# Patient Record
Sex: MALE | Race: WHITE | Employment: OTHER | URBAN - METROPOLITAN AREA
[De-identification: names, ages, dates, MRNs, and addresses within clinical notes are randomized per-mention and may not be internally consistent; named-entity substitution may affect disease eponyms.]

---

## 2022-05-16 ENCOUNTER — TELEPHONE (OUTPATIENT)
Dept: FAMILY MEDICINE CLINIC | Facility: CLINIC | Age: 72
End: 2022-05-16

## 2022-05-16 NOTE — TELEPHONE ENCOUNTER
Surgery on 06/14/22, LTHA with Dr. Ankur Sanchez   @ [address] [place] [address]    H&P- completed 05/17/22  Labs- CBC, INR will be sent to scanning- RBC (4.21), Mono% (13.8), (INR 2.2). BUN (20), BUN/Creat ratio (21.7), +MSSA, all other labs WNL  EKG- abnormal- a-fib- Poor R wave progression, nonspecific ST depression and nonspecific T-abnormality. No previous EKG's available    Patient sees Cardiology in [facility])-  Dr. Tatyana Rodrigez- [phone]   F- [phone]  A-fib  Cardiac Clx sent to scanning 05/30/22  \"This letter is to confirm that Mr. Nelson is cleared for surgery from a Cardiac perspective. Please feel free to contact my office with any questions. \"    Patient states his PCP manages Coumadin for A-fib  Dr. Leigh Ann Santorof- [phone]  F- [phone]  Dr. Sandrine Cervantes called and differe any coumadin recommendations to Dr. Júnior Gallego. Dr Júnior Gallego ordered Lovenox 150mg/ml daily 4 days prior to surgery. He will hold coumadin starting 06/09/22, he will start Lovenox 06/10/22-06/13/22. He will hold the day of surgery. Patient will also need Bactroban ointment BID for 5 days prior to surgery. Sent a fax to Dr. Sandrine Cervantes for him to send in both meds for patient since pt lives in [place] Told them to call with any questions. Spoke to the patient and gave him instructions over the phone for lovenox and bactroban and date he needs to hold/take medications. He states understanding.  05/31/22

## 2022-05-17 ENCOUNTER — OFFICE VISIT (OUTPATIENT)
Dept: FAMILY MEDICINE CLINIC | Facility: CLINIC | Age: 72
End: 2022-05-17

## 2022-05-17 ENCOUNTER — EKG ENCOUNTER (OUTPATIENT)
Dept: LAB | Facility: HOSPITAL | Age: 72
End: 2022-05-17
Attending: FAMILY MEDICINE

## 2022-05-17 ENCOUNTER — LAB ENCOUNTER (OUTPATIENT)
Dept: LAB | Facility: HOSPITAL | Age: 72
End: 2022-05-17
Attending: FAMILY MEDICINE

## 2022-05-17 VITALS
RESPIRATION RATE: 16 BRPM | HEART RATE: 70 BPM | BODY MASS INDEX: 33.33 KG/M2 | TEMPERATURE: 97 F | OXYGEN SATURATION: 98 % | HEIGHT: 69 IN | DIASTOLIC BLOOD PRESSURE: 78 MMHG | SYSTOLIC BLOOD PRESSURE: 152 MMHG | WEIGHT: 225 LBS

## 2022-05-17 DIAGNOSIS — Z01.818 PREOP EXAMINATION: ICD-10-CM

## 2022-05-17 DIAGNOSIS — M15.9 PRIMARY OSTEOARTHRITIS INVOLVING MULTIPLE JOINTS: ICD-10-CM

## 2022-05-17 DIAGNOSIS — M47.816 LUMBAR SPONDYLOSIS: ICD-10-CM

## 2022-05-17 DIAGNOSIS — I48.0 PAROXYSMAL ATRIAL FIBRILLATION (HCC): ICD-10-CM

## 2022-05-17 DIAGNOSIS — I10 PRIMARY HYPERTENSION: ICD-10-CM

## 2022-05-17 DIAGNOSIS — Z01.812 PRE-OPERATIVE LABORATORY EXAMINATION: ICD-10-CM

## 2022-05-17 DIAGNOSIS — G47.33 OSA (OBSTRUCTIVE SLEEP APNEA): ICD-10-CM

## 2022-05-17 DIAGNOSIS — M16.12 PRIMARY OSTEOARTHRITIS OF LEFT HIP: Primary | ICD-10-CM

## 2022-05-17 DIAGNOSIS — C61 PROSTATE CANCER (HCC): ICD-10-CM

## 2022-05-17 DIAGNOSIS — Z95.2 H/O MECHANICAL AORTIC VALVE REPLACEMENT: ICD-10-CM

## 2022-05-17 DIAGNOSIS — F39 AFFECTIVE DISORDER (HCC): ICD-10-CM

## 2022-05-17 LAB
ALBUMIN SERPL-MCNC: 4 G/DL (ref 3.4–5)
ALBUMIN/GLOB SERPL: 1.1 {RATIO} (ref 1–2)
ALP LIVER SERPL-CCNC: 60 U/L
ALT SERPL-CCNC: 25 U/L
ANION GAP SERPL CALC-SCNC: 8 MMOL/L (ref 0–18)
ANTIBODY SCREEN: NEGATIVE
AST SERPL-CCNC: 26 U/L (ref 15–37)
BILIRUB SERPL-MCNC: 0.7 MG/DL (ref 0.1–2)
BUN BLD-MCNC: 20 MG/DL (ref 7–18)
BUN/CREAT SERPL: 21.7 (ref 10–20)
CALCIUM BLD-MCNC: 9 MG/DL (ref 8.5–10.1)
CHLORIDE SERPL-SCNC: 106 MMOL/L (ref 98–112)
CO2 SERPL-SCNC: 26 MMOL/L (ref 21–32)
CREAT BLD-MCNC: 0.92 MG/DL
FASTING STATUS PATIENT QL REPORTED: YES
GLOBULIN PLAS-MCNC: 3.6 G/DL (ref 2.8–4.4)
GLUCOSE BLD-MCNC: 99 MG/DL (ref 70–99)
OSMOLALITY SERPL CALC.SUM OF ELEC: 293 MOSM/KG (ref 275–295)
POTASSIUM SERPL-SCNC: 4.3 MMOL/L (ref 3.5–5.1)
PROT SERPL-MCNC: 7.6 G/DL (ref 6.4–8.2)
RH BLOOD TYPE: POSITIVE
RH BLOOD TYPE: POSITIVE
SODIUM SERPL-SCNC: 140 MMOL/L (ref 136–145)

## 2022-05-17 PROCEDURE — 36415 COLL VENOUS BLD VENIPUNCTURE: CPT

## 2022-05-17 PROCEDURE — 86900 BLOOD TYPING SEROLOGIC ABO: CPT

## 2022-05-17 PROCEDURE — 86850 RBC ANTIBODY SCREEN: CPT

## 2022-05-17 PROCEDURE — 86901 BLOOD TYPING SEROLOGIC RH(D): CPT

## 2022-05-17 PROCEDURE — 3008F BODY MASS INDEX DOCD: CPT | Performed by: FAMILY MEDICINE

## 2022-05-17 PROCEDURE — 99204 OFFICE O/P NEW MOD 45 MIN: CPT | Performed by: FAMILY MEDICINE

## 2022-05-17 PROCEDURE — 3078F DIAST BP <80 MM HG: CPT | Performed by: FAMILY MEDICINE

## 2022-05-17 PROCEDURE — 87081 CULTURE SCREEN ONLY: CPT

## 2022-05-17 PROCEDURE — 93010 ELECTROCARDIOGRAM REPORT: CPT | Performed by: FAMILY MEDICINE

## 2022-05-17 PROCEDURE — 3077F SYST BP >= 140 MM HG: CPT | Performed by: FAMILY MEDICINE

## 2022-05-17 PROCEDURE — 80053 COMPREHEN METABOLIC PANEL: CPT

## 2022-05-17 PROCEDURE — 93005 ELECTROCARDIOGRAM TRACING: CPT

## 2022-05-17 RX ORDER — WARFARIN SODIUM 6 MG/1
6 TABLET ORAL DAILY
COMMUNITY

## 2022-05-18 RX ORDER — ENZALUTAMIDE 40 MG/1
160 CAPSULE ORAL DAILY
COMMUNITY

## 2022-05-18 RX ORDER — ESCITALOPRAM OXALATE 10 MG/1
10 TABLET ORAL DAILY
COMMUNITY

## 2022-05-18 RX ORDER — FLUTICASONE FUROATE 27.5 UG/1
1 SPRAY, METERED NASAL DAILY PRN
COMMUNITY

## 2022-05-18 RX ORDER — ENZALUTAMIDE 80 MG/1
160 TABLET ORAL DAILY
COMMUNITY
End: 2022-05-18

## 2022-05-18 RX ORDER — TELMISARTAN AND HYDROCHLORTHIAZIDE 80; 12.5 MG/1; MG/1
2 TABLET ORAL DAILY
COMMUNITY

## 2022-05-18 RX ORDER — AMLODIPINE BESYLATE 5 MG/1
5 TABLET ORAL DAILY
COMMUNITY

## 2022-05-31 RX ORDER — ENOXAPARIN SODIUM 150 MG/ML
1 INJECTION SUBCUTANEOUS DAILY
Qty: 8 EACH | Refills: 0 | Status: SHIPPED
Start: 2022-05-31

## 2022-05-31 RX ORDER — ENOXAPARIN SODIUM 150 MG/ML
1 INJECTION SUBCUTANEOUS DAILY
Qty: 8 EACH | Refills: 0 | OUTPATIENT
Start: 2022-05-31 | End: 2022-05-31

## 2022-05-31 NOTE — TELEPHONE ENCOUNTER
Dr. Kari Singh, please review:    Labs- CBC, INR will be sent to scanning- RBC (4.21), Mono% (13.8), (INR 2.2). BUN (20), BUN/Creat ratio (21.7), +MSSA, all other labs WNL  EKG- abnormal- a-fib- Poor R wave progression, nonspecific ST depression and nonspecific T-abnormality. No previous EKG's available    Cardiac Clx scanned into [facility] 05/27/22  \"This letter is to confirm that Mr. Nelson is cleared for surgery from a Cardiac perspective. Please feel free to contact my office with any questions. \"    Dr. Ronald Tran (PCP who manages coumadin)  called and differe any coumadin recommendations to Dr. Kari Singh. Dr Kari Singh ordered Lovenox 150mg/ml daily 4 days prior to surgery. He will hold coumadin starting 06/09/22, he will start Lovenox 06/10/22-06/13/22. He will hold the day of surgery. Patient will also need Bactroban ointment BID for 5 days prior to surgery. Sent a fax to Dr. Ronald Tran for him to send in both meds for patient since pt lives in [place] Told them to call with any questions. Spoke to the patient and gave him instructions over the phone for lovenox and bactroban and date he needs to hold/take medications. He states understanding. 05/31/22    OK for surgery?

## 2022-05-31 NOTE — TELEPHONE ENCOUNTER
Patient notified he is clear for surgery and instructions on meds below. He will call us if he has any trouble getting his meds from PCP.

## 2022-06-01 NOTE — TELEPHONE ENCOUNTER
Received script from PCP Dr. Gui Araya office via fax for Lovenox and abx ointment. Need to call and see why it was sent to us and make sure it was sent to [facility]).

## 2022-06-02 NOTE — TELEPHONE ENCOUNTER
Spoke to RN at Dr. Urmila Herrera office, they misunderstood us and thought we needed a script for the meds. They will be sending in both Lovenox and Bactroban for patient to a local pharmacy in [facility]).

## 2022-06-10 PROBLEM — Z95.2 S/P AVR: Status: ACTIVE | Noted: 2022-06-10

## 2022-06-10 PROBLEM — M47.816 LUMBAR SPONDYLOSIS: Status: ACTIVE | Noted: 2022-06-10

## 2022-06-10 PROBLEM — M16.12 PRIMARY OSTEOARTHRITIS OF ONE HIP, LEFT: Status: ACTIVE | Noted: 2022-06-10

## 2022-06-10 PROBLEM — Z85.46 HISTORY OF PROSTATE CANCER: Status: ACTIVE | Noted: 2022-06-10

## 2022-06-10 RX ORDER — METOCLOPRAMIDE 10 MG/1
10 TABLET ORAL ONCE
Status: CANCELLED | OUTPATIENT
Start: 2022-06-10 | End: 2022-06-10

## 2022-06-10 RX ORDER — ACETAMINOPHEN AND CODEINE PHOSPHATE 300; 30 MG/1; MG/1
1 TABLET ORAL EVERY 4 HOURS PRN
COMMUNITY
End: 2022-06-15

## 2022-06-10 RX ORDER — CLOBETASOL PROPIONATE 0.5 MG/G
CREAM TOPICAL 2 TIMES DAILY
COMMUNITY

## 2022-06-13 ENCOUNTER — LAB ENCOUNTER (OUTPATIENT)
Dept: LAB | Facility: HOSPITAL | Age: 72
End: 2022-06-13
Attending: ORTHOPAEDIC SURGERY

## 2022-06-13 DIAGNOSIS — Z01.818 PRE-OP TESTING: ICD-10-CM

## 2022-06-13 LAB — SARS-COV-2 RNA RESP QL NAA+PROBE: NOT DETECTED

## 2022-06-14 ENCOUNTER — HOSPITAL ENCOUNTER (OUTPATIENT)
Facility: HOSPITAL | Age: 72
Discharge: HOME HEALTH | End: 2022-06-15
Attending: ORTHOPAEDIC SURGERY | Admitting: ORTHOPAEDIC SURGERY

## 2022-06-14 ENCOUNTER — APPOINTMENT (OUTPATIENT)
Dept: GENERAL RADIOLOGY | Facility: HOSPITAL | Age: 72
End: 2022-06-14
Attending: ORTHOPAEDIC SURGERY

## 2022-06-14 ENCOUNTER — ANESTHESIA EVENT (OUTPATIENT)
Dept: SURGERY | Facility: HOSPITAL | Age: 72
End: 2022-06-14

## 2022-06-14 ENCOUNTER — ANESTHESIA (OUTPATIENT)
Dept: SURGERY | Facility: HOSPITAL | Age: 72
End: 2022-06-14

## 2022-06-14 DIAGNOSIS — Z01.818 PRE-OP TESTING: Primary | ICD-10-CM

## 2022-06-14 PROBLEM — Z96.659 HISTORY OF REVISION OF TOTAL KNEE ARTHROPLASTY: Status: ACTIVE | Noted: 2022-06-14

## 2022-06-14 LAB
INR BLD: 0.98 (ref 0.8–1.2)
PROTHROMBIN TIME: 13.1 SECONDS (ref 11.6–14.8)

## 2022-06-14 PROCEDURE — 97110 THERAPEUTIC EXERCISES: CPT

## 2022-06-14 PROCEDURE — 0SRB04A REPLACEMENT OF LEFT HIP JOINT WITH CERAMIC ON POLYETHYLENE SYNTHETIC SUBSTITUTE, UNCEMENTED, OPEN APPROACH: ICD-10-PCS | Performed by: ORTHOPAEDIC SURGERY

## 2022-06-14 PROCEDURE — 36415 COLL VENOUS BLD VENIPUNCTURE: CPT | Performed by: ORTHOPAEDIC SURGERY

## 2022-06-14 PROCEDURE — 85610 PROTHROMBIN TIME: CPT | Performed by: ORTHOPAEDIC SURGERY

## 2022-06-14 PROCEDURE — 8E0YXBZ COMPUTER ASSISTED PROCEDURE OF LOWER EXTREMITY: ICD-10-PCS | Performed by: ORTHOPAEDIC SURGERY

## 2022-06-14 PROCEDURE — 97116 GAIT TRAINING THERAPY: CPT

## 2022-06-14 PROCEDURE — 97162 PT EVAL MOD COMPLEX 30 MIN: CPT

## 2022-06-14 PROCEDURE — 88311 DECALCIFY TISSUE: CPT | Performed by: ORTHOPAEDIC SURGERY

## 2022-06-14 PROCEDURE — 88305 TISSUE EXAM BY PATHOLOGIST: CPT | Performed by: ORTHOPAEDIC SURGERY

## 2022-06-14 PROCEDURE — 73502 X-RAY EXAM HIP UNI 2-3 VIEWS: CPT | Performed by: ORTHOPAEDIC SURGERY

## 2022-06-14 DEVICE — IMPLANTABLE DEVICE: Type: IMPLANTABLE DEVICE | Site: HIP | Status: FUNCTIONAL

## 2022-06-14 DEVICE — IMPLANTABLE DEVICE
Type: IMPLANTABLE DEVICE | Site: HIP | Status: FUNCTIONAL
Brand: G7® ACETABULAR SYSTEM

## 2022-06-14 DEVICE — IMPLANTABLE DEVICE
Type: IMPLANTABLE DEVICE | Site: HIP | Status: FUNCTIONAL
Brand: G7® VIVACIT-E®

## 2022-06-14 RX ORDER — HYDROMORPHONE HYDROCHLORIDE 1 MG/ML
0.2 INJECTION, SOLUTION INTRAMUSCULAR; INTRAVENOUS; SUBCUTANEOUS EVERY 5 MIN PRN
Status: DISCONTINUED | OUTPATIENT
Start: 2022-06-14 | End: 2022-06-14 | Stop reason: HOSPADM

## 2022-06-14 RX ORDER — HYDROMORPHONE HYDROCHLORIDE 1 MG/ML
0.4 INJECTION, SOLUTION INTRAMUSCULAR; INTRAVENOUS; SUBCUTANEOUS EVERY 2 HOUR PRN
Status: DISCONTINUED | OUTPATIENT
Start: 2022-06-14 | End: 2022-06-15

## 2022-06-14 RX ORDER — FAMOTIDINE 20 MG/1
20 TABLET, FILM COATED ORAL ONCE
Status: COMPLETED | OUTPATIENT
Start: 2022-06-14 | End: 2022-06-14

## 2022-06-14 RX ORDER — SENNOSIDES 8.6 MG
17.2 TABLET ORAL NIGHTLY
Status: DISCONTINUED | OUTPATIENT
Start: 2022-06-14 | End: 2022-06-15

## 2022-06-14 RX ORDER — OXYCODONE HYDROCHLORIDE 5 MG/1
2.5 TABLET ORAL EVERY 4 HOURS PRN
Status: DISCONTINUED | OUTPATIENT
Start: 2022-06-14 | End: 2022-06-15

## 2022-06-14 RX ORDER — MORPHINE SULFATE 10 MG/ML
6 INJECTION, SOLUTION INTRAMUSCULAR; INTRAVENOUS EVERY 10 MIN PRN
Status: DISCONTINUED | OUTPATIENT
Start: 2022-06-14 | End: 2022-06-14 | Stop reason: HOSPADM

## 2022-06-14 RX ORDER — TRANEXAMIC ACID 10 MG/ML
1000 INJECTION, SOLUTION INTRAVENOUS ONCE
Status: COMPLETED | OUTPATIENT
Start: 2022-06-15 | End: 2022-06-14

## 2022-06-14 RX ORDER — WARFARIN SODIUM 6 MG/1
6 TABLET ORAL NIGHTLY
Status: DISCONTINUED | OUTPATIENT
Start: 2022-06-14 | End: 2022-06-14

## 2022-06-14 RX ORDER — DOCUSATE SODIUM 100 MG/1
100 CAPSULE, LIQUID FILLED ORAL 2 TIMES DAILY
Status: DISCONTINUED | OUTPATIENT
Start: 2022-06-14 | End: 2022-06-15

## 2022-06-14 RX ORDER — ONDANSETRON 2 MG/ML
4 INJECTION INTRAMUSCULAR; INTRAVENOUS EVERY 4 HOURS PRN
Status: DISCONTINUED | OUTPATIENT
Start: 2022-06-14 | End: 2022-06-15

## 2022-06-14 RX ORDER — MORPHINE SULFATE 4 MG/ML
4 INJECTION, SOLUTION INTRAMUSCULAR; INTRAVENOUS EVERY 10 MIN PRN
Status: DISCONTINUED | OUTPATIENT
Start: 2022-06-14 | End: 2022-06-14 | Stop reason: HOSPADM

## 2022-06-14 RX ORDER — MORPHINE SULFATE 4 MG/ML
2 INJECTION, SOLUTION INTRAMUSCULAR; INTRAVENOUS EVERY 10 MIN PRN
Status: DISCONTINUED | OUTPATIENT
Start: 2022-06-14 | End: 2022-06-14 | Stop reason: HOSPADM

## 2022-06-14 RX ORDER — ENOXAPARIN SODIUM 100 MG/ML
40 INJECTION SUBCUTANEOUS ONCE
Status: COMPLETED | OUTPATIENT
Start: 2022-06-14 | End: 2022-06-14

## 2022-06-14 RX ORDER — FLUTICASONE PROPIONATE 50 MCG
2 SPRAY, SUSPENSION (ML) NASAL DAILY
Status: DISCONTINUED | OUTPATIENT
Start: 2022-06-14 | End: 2022-06-15

## 2022-06-14 RX ORDER — NALOXONE HYDROCHLORIDE 0.4 MG/ML
80 INJECTION, SOLUTION INTRAMUSCULAR; INTRAVENOUS; SUBCUTANEOUS AS NEEDED
Status: DISCONTINUED | OUTPATIENT
Start: 2022-06-14 | End: 2022-06-14 | Stop reason: HOSPADM

## 2022-06-14 RX ORDER — SODIUM CHLORIDE, SODIUM LACTATE, POTASSIUM CHLORIDE, CALCIUM CHLORIDE 600; 310; 30; 20 MG/100ML; MG/100ML; MG/100ML; MG/100ML
INJECTION, SOLUTION INTRAVENOUS CONTINUOUS
Status: DISCONTINUED | OUTPATIENT
Start: 2022-06-14 | End: 2022-06-15

## 2022-06-14 RX ORDER — PANTOPRAZOLE SODIUM 40 MG/1
40 TABLET, DELAYED RELEASE ORAL
Qty: 30 TABLET | Refills: 0 | Status: SHIPPED | OUTPATIENT
Start: 2022-06-14

## 2022-06-14 RX ORDER — DEXAMETHASONE SODIUM PHOSPHATE 4 MG/ML
VIAL (ML) INJECTION AS NEEDED
Status: DISCONTINUED | OUTPATIENT
Start: 2022-06-14 | End: 2022-06-14 | Stop reason: SURG

## 2022-06-14 RX ORDER — TRANEXAMIC ACID 10 MG/ML
INJECTION, SOLUTION INTRAVENOUS AS NEEDED
Status: DISCONTINUED | OUTPATIENT
Start: 2022-06-14 | End: 2022-06-14 | Stop reason: SURG

## 2022-06-14 RX ORDER — OXYCODONE HYDROCHLORIDE 5 MG/1
5 TABLET ORAL EVERY 4 HOURS PRN
Status: DISCONTINUED | OUTPATIENT
Start: 2022-06-14 | End: 2022-06-15

## 2022-06-14 RX ORDER — ESCITALOPRAM OXALATE 5 MG/1
10 TABLET ORAL EVERY EVENING
Status: DISCONTINUED | OUTPATIENT
Start: 2022-06-14 | End: 2022-06-15

## 2022-06-14 RX ORDER — ACETAMINOPHEN 500 MG
1000 TABLET ORAL ONCE
Status: COMPLETED | OUTPATIENT
Start: 2022-06-14 | End: 2022-06-14

## 2022-06-14 RX ORDER — SCOLOPAMINE TRANSDERMAL SYSTEM 1 MG/1
1 PATCH, EXTENDED RELEASE TRANSDERMAL ONCE
Status: DISCONTINUED | OUTPATIENT
Start: 2022-06-14 | End: 2022-06-15

## 2022-06-14 RX ORDER — ESCITALOPRAM OXALATE 5 MG/1
10 TABLET ORAL DAILY
Status: DISCONTINUED | OUTPATIENT
Start: 2022-06-14 | End: 2022-06-14

## 2022-06-14 RX ORDER — SODIUM PHOSPHATE, DIBASIC AND SODIUM PHOSPHATE, MONOBASIC 7; 19 G/133ML; G/133ML
1 ENEMA RECTAL ONCE AS NEEDED
Status: DISCONTINUED | OUTPATIENT
Start: 2022-06-14 | End: 2022-06-15

## 2022-06-14 RX ORDER — CEFAZOLIN SODIUM/WATER 2 G/20 ML
2 SYRINGE (ML) INTRAVENOUS EVERY 8 HOURS
Status: COMPLETED | OUTPATIENT
Start: 2022-06-14 | End: 2022-06-15

## 2022-06-14 RX ORDER — OXYCODONE HCL 10 MG/1
10 TABLET, FILM COATED, EXTENDED RELEASE ORAL ONCE
Status: COMPLETED | OUTPATIENT
Start: 2022-06-14 | End: 2022-06-14

## 2022-06-14 RX ORDER — LIDOCAINE HYDROCHLORIDE 40 MG/ML
SOLUTION TOPICAL AS NEEDED
Status: DISCONTINUED | OUTPATIENT
Start: 2022-06-14 | End: 2022-06-14 | Stop reason: SURG

## 2022-06-14 RX ORDER — ONDANSETRON 2 MG/ML
INJECTION INTRAMUSCULAR; INTRAVENOUS AS NEEDED
Status: DISCONTINUED | OUTPATIENT
Start: 2022-06-14 | End: 2022-06-14 | Stop reason: SURG

## 2022-06-14 RX ORDER — ENOXAPARIN SODIUM 100 MG/ML
40 INJECTION SUBCUTANEOUS DAILY
Status: DISCONTINUED | OUTPATIENT
Start: 2022-06-15 | End: 2022-06-14

## 2022-06-14 RX ORDER — ENOXAPARIN SODIUM 100 MG/ML
40 INJECTION SUBCUTANEOUS DAILY
Status: DISCONTINUED | OUTPATIENT
Start: 2022-06-15 | End: 2022-06-15

## 2022-06-14 RX ORDER — GABAPENTIN 100 MG/1
200 CAPSULE ORAL 3 TIMES DAILY
Qty: 180 CAPSULE | Refills: 0 | Status: SHIPPED | OUTPATIENT
Start: 2022-06-14 | End: 2022-07-14

## 2022-06-14 RX ORDER — CEFAZOLIN SODIUM/WATER 2 G/20 ML
2 SYRINGE (ML) INTRAVENOUS ONCE
Status: COMPLETED | OUTPATIENT
Start: 2022-06-14 | End: 2022-06-14

## 2022-06-14 RX ORDER — WARFARIN SODIUM 1 MG/1
1 TABLET ORAL NIGHTLY
Status: DISCONTINUED | OUTPATIENT
Start: 2022-06-14 | End: 2022-06-14

## 2022-06-14 RX ORDER — ROCURONIUM BROMIDE 10 MG/ML
INJECTION, SOLUTION INTRAVENOUS AS NEEDED
Status: DISCONTINUED | OUTPATIENT
Start: 2022-06-14 | End: 2022-06-14 | Stop reason: SURG

## 2022-06-14 RX ORDER — POLYETHYLENE GLYCOL 3350 17 G/17G
17 POWDER, FOR SOLUTION ORAL DAILY PRN
Status: DISCONTINUED | OUTPATIENT
Start: 2022-06-14 | End: 2022-06-15

## 2022-06-14 RX ORDER — GLYCOPYRROLATE 0.2 MG/ML
INJECTION, SOLUTION INTRAMUSCULAR; INTRAVENOUS AS NEEDED
Status: DISCONTINUED | OUTPATIENT
Start: 2022-06-14 | End: 2022-06-14 | Stop reason: SURG

## 2022-06-14 RX ORDER — BISACODYL 10 MG
10 SUPPOSITORY, RECTAL RECTAL
Status: DISCONTINUED | OUTPATIENT
Start: 2022-06-14 | End: 2022-06-15

## 2022-06-14 RX ORDER — ACETAMINOPHEN 500 MG
TABLET ORAL EVERY 6 HOURS PRN
Qty: 60 TABLET | Refills: 2 | Status: SHIPPED | OUTPATIENT
Start: 2022-06-14

## 2022-06-14 RX ORDER — DOCUSATE SODIUM 100 MG/1
100 CAPSULE, LIQUID FILLED ORAL 2 TIMES DAILY
Qty: 60 CAPSULE | Refills: 1 | Status: SHIPPED | OUTPATIENT
Start: 2022-06-14

## 2022-06-14 RX ORDER — SODIUM CHLORIDE, SODIUM LACTATE, POTASSIUM CHLORIDE, CALCIUM CHLORIDE 600; 310; 30; 20 MG/100ML; MG/100ML; MG/100ML; MG/100ML
INJECTION, SOLUTION INTRAVENOUS CONTINUOUS
Status: DISCONTINUED | OUTPATIENT
Start: 2022-06-14 | End: 2022-06-14 | Stop reason: HOSPADM

## 2022-06-14 RX ORDER — DIPHENHYDRAMINE HYDROCHLORIDE 50 MG/ML
12.5 INJECTION INTRAMUSCULAR; INTRAVENOUS EVERY 4 HOURS PRN
Status: DISCONTINUED | OUTPATIENT
Start: 2022-06-14 | End: 2022-06-15

## 2022-06-14 RX ORDER — LIDOCAINE HYDROCHLORIDE 10 MG/ML
INJECTION, SOLUTION EPIDURAL; INFILTRATION; INTRACAUDAL; PERINEURAL AS NEEDED
Status: DISCONTINUED | OUTPATIENT
Start: 2022-06-14 | End: 2022-06-14 | Stop reason: SURG

## 2022-06-14 RX ORDER — CLOBETASOL PROPIONATE 0.5 MG/G
CREAM TOPICAL 2 TIMES DAILY PRN
Status: DISCONTINUED | OUTPATIENT
Start: 2022-06-14 | End: 2022-06-15

## 2022-06-14 RX ORDER — WARFARIN SODIUM 7.5 MG/1
7.5 TABLET ORAL NIGHTLY
Status: DISCONTINUED | OUTPATIENT
Start: 2022-06-14 | End: 2022-06-15

## 2022-06-14 RX ORDER — PROCHLORPERAZINE EDISYLATE 5 MG/ML
10 INJECTION INTRAMUSCULAR; INTRAVENOUS EVERY 6 HOURS PRN
Status: DISCONTINUED | OUTPATIENT
Start: 2022-06-14 | End: 2022-06-15

## 2022-06-14 RX ORDER — HYDROMORPHONE HYDROCHLORIDE 1 MG/ML
0.6 INJECTION, SOLUTION INTRAMUSCULAR; INTRAVENOUS; SUBCUTANEOUS EVERY 5 MIN PRN
Status: DISCONTINUED | OUTPATIENT
Start: 2022-06-14 | End: 2022-06-14 | Stop reason: HOSPADM

## 2022-06-14 RX ORDER — DIPHENHYDRAMINE HCL 25 MG
25 CAPSULE ORAL EVERY 4 HOURS PRN
Status: DISCONTINUED | OUTPATIENT
Start: 2022-06-14 | End: 2022-06-15

## 2022-06-14 RX ORDER — ONDANSETRON 4 MG/1
4 TABLET, FILM COATED ORAL EVERY 6 HOURS PRN
Qty: 20 TABLET | Refills: 0 | Status: SHIPPED | OUTPATIENT
Start: 2022-06-14

## 2022-06-14 RX ORDER — NEOSTIGMINE METHYLSULFATE 1 MG/ML
INJECTION INTRAVENOUS AS NEEDED
Status: DISCONTINUED | OUTPATIENT
Start: 2022-06-14 | End: 2022-06-14 | Stop reason: SURG

## 2022-06-14 RX ORDER — TELMISARTAN AND HYDROCHLORTHIAZIDE 80; 12.5 MG/1; MG/1
2 TABLET ORAL DAILY
Status: DISCONTINUED | OUTPATIENT
Start: 2022-06-14 | End: 2022-06-14

## 2022-06-14 RX ORDER — TELMISARTAN AND HYDROCHLORTHIAZIDE 80; 12.5 MG/1; MG/1
2 TABLET ORAL DAILY
Status: DISCONTINUED | OUTPATIENT
Start: 2022-06-14 | End: 2022-06-15

## 2022-06-14 RX ORDER — TRAMADOL HYDROCHLORIDE 50 MG/1
TABLET ORAL EVERY 6 HOURS PRN
Qty: 60 TABLET | Refills: 1 | Status: SHIPPED | OUTPATIENT
Start: 2022-06-14

## 2022-06-14 RX ORDER — CELECOXIB 200 MG/1
200 CAPSULE ORAL ONCE
Status: COMPLETED | OUTPATIENT
Start: 2022-06-14 | End: 2022-06-14

## 2022-06-14 RX ORDER — DIPHENHYDRAMINE HYDROCHLORIDE 50 MG/ML
25 INJECTION INTRAMUSCULAR; INTRAVENOUS ONCE AS NEEDED
Status: ACTIVE | OUTPATIENT
Start: 2022-06-14 | End: 2022-06-14

## 2022-06-14 RX ORDER — ENOXAPARIN SODIUM 100 MG/ML
40 INJECTION SUBCUTANEOUS DAILY
Qty: 14 EACH | Refills: 0 | Status: SHIPPED | OUTPATIENT
Start: 2022-06-14

## 2022-06-14 RX ORDER — OXYCODONE HYDROCHLORIDE 5 MG/1
TABLET ORAL
Qty: 60 TABLET | Refills: 0 | Status: SHIPPED | OUTPATIENT
Start: 2022-06-14

## 2022-06-14 RX ORDER — CYCLOBENZAPRINE HCL 5 MG
5 TABLET ORAL EVERY 8 HOURS PRN
Status: DISCONTINUED | OUTPATIENT
Start: 2022-06-14 | End: 2022-06-15

## 2022-06-14 RX ORDER — HYDROMORPHONE HYDROCHLORIDE 1 MG/ML
0.4 INJECTION, SOLUTION INTRAMUSCULAR; INTRAVENOUS; SUBCUTANEOUS EVERY 5 MIN PRN
Status: DISCONTINUED | OUTPATIENT
Start: 2022-06-14 | End: 2022-06-14 | Stop reason: HOSPADM

## 2022-06-14 RX ORDER — HYDROMORPHONE HYDROCHLORIDE 1 MG/ML
0.2 INJECTION, SOLUTION INTRAMUSCULAR; INTRAVENOUS; SUBCUTANEOUS EVERY 2 HOUR PRN
Status: DISCONTINUED | OUTPATIENT
Start: 2022-06-14 | End: 2022-06-15

## 2022-06-14 RX ORDER — AMLODIPINE BESYLATE 5 MG/1
5 TABLET ORAL DAILY
Status: DISCONTINUED | OUTPATIENT
Start: 2022-06-14 | End: 2022-06-15

## 2022-06-14 RX ADMIN — ROCURONIUM BROMIDE 40 MG: 10 INJECTION, SOLUTION INTRAVENOUS at 07:38:00

## 2022-06-14 RX ADMIN — LIDOCAINE HYDROCHLORIDE 50 MG: 10 INJECTION, SOLUTION EPIDURAL; INFILTRATION; INTRACAUDAL; PERINEURAL at 07:28:00

## 2022-06-14 RX ADMIN — LIDOCAINE HYDROCHLORIDE 4 ML: 40 SOLUTION TOPICAL at 07:34:00

## 2022-06-14 RX ADMIN — DEXAMETHASONE SODIUM PHOSPHATE 4 MG: 4 MG/ML VIAL (ML) INJECTION at 07:45:00

## 2022-06-14 RX ADMIN — SODIUM CHLORIDE, SODIUM LACTATE, POTASSIUM CHLORIDE, CALCIUM CHLORIDE: 600; 310; 30; 20 INJECTION, SOLUTION INTRAVENOUS at 09:18:00

## 2022-06-14 RX ADMIN — GLYCOPYRROLATE 0.2 MG: 0.2 INJECTION, SOLUTION INTRAMUSCULAR; INTRAVENOUS at 08:00:00

## 2022-06-14 RX ADMIN — CEFAZOLIN SODIUM/WATER 2 G: 2 G/20 ML SYRINGE (ML) INTRAVENOUS at 07:40:00

## 2022-06-14 RX ADMIN — SODIUM CHLORIDE, SODIUM LACTATE, POTASSIUM CHLORIDE, CALCIUM CHLORIDE: 600; 310; 30; 20 INJECTION, SOLUTION INTRAVENOUS at 07:27:00

## 2022-06-14 RX ADMIN — TRANEXAMIC ACID 1000 MG: 10 INJECTION, SOLUTION INTRAVENOUS at 07:44:00

## 2022-06-14 RX ADMIN — GLYCOPYRROLATE 0.8 MG: 0.2 INJECTION, SOLUTION INTRAMUSCULAR; INTRAVENOUS at 09:25:00

## 2022-06-14 RX ADMIN — ONDANSETRON 4 MG: 2 INJECTION INTRAMUSCULAR; INTRAVENOUS at 07:45:00

## 2022-06-14 RX ADMIN — NEOSTIGMINE METHYLSULFATE 5 MG: 1 INJECTION INTRAVENOUS at 09:25:00

## 2022-06-14 NOTE — OPERATIVE REPORT
OPERATIVE REPORT     PROCEDURE DATE: 6/14/2022     SURGEON: Ivanna Blount MD      ASSISTANT SURGEON: Polina Washington MD  (No qualified resident was available to assist with this case; we will thus bill for fellow)    ASSISTANT: Keyana Cooney, surgical assist.     PREOPERATIVE DIAGNOSIS: left hip degenerative joint disease     POSTOPERATIVE DIAGNOSIS: left hip degenerative joint disease    PROCEDURE: left total hip arthoplasty    ANESTHESIA: GETA    PREOPERATIVE ANTIBIOTICS: Ancef 2 g IV within 60 minutes of procedure start. ESTIMATED BLOOD LOSS: 200 mL. ESTIMATED IV FLUIDS: see anesthesia record    ESTIMATED URINE OUTPUT: no rowell       IMPLANTS   1. Sarah G7 acetabular shell, multihole, 58 mm outer diameter, size G.   2. 6.5 mm bone screws x30  3. Sarah G7 highly cross-linked polyethylene liner; neutral, 4 mm inner diameter, size G.   4. Sarah M/L taper femoral stem, standard offset, size 11.   5. Sarah Biolox Delta ceramic femoral head, 40 mm, +3.5, 12-14 taper. SPECIMENS  Femoral head to pathology    COMPLICATIONS   None apparent. FINDINGS   Consistent with end-stage hip degenerative joint disease. INDICATIONS   Tejas Golden is a 73year old male who has been followed by the orthopedic surgery service for left hip degenerative joint disease. Tejas Golden was previously seen and evaluated in the orthopedic clinic and diagnosed with hip degenerative joint disease. After nonoperative treatment measures such as physical therapy, activity modification, weight loss, and NSAIDs failed to improved the patient's symptoms, Tejas Golden wished to proceed with surgery and was therefore scheduled for the above-described procedure on an elective basis. TECHNIQUE   Tejas Golden was identified and greeted in the preoperative holding area and was identified using medical record number, name, and date of birth, all of which were confirmed.  The operative hip was marked using an indelible marker and informed consent was verbally confirmed. The patient had previously signed a consent in clinic. Once again, all risks and benefits as well as alternatives to surgical intervention were discussed with the patient in detail and all the questions were answered. Risks discussed included but were not limited to pain, infection, bleeding, scarring, stiffness, thromboembolic events, severe limb dysfunction, loss of limb, and loss of life. The patient verbally confirmed the consent and wished to proceed with surgery as scheduled. The anesthesia service then proceeded with anesthesia and August Mejia was taken to the operating room and placed on the operating table in the lateral decubitus position. Care was taken to pad all bony prominences well. The patient was locked in the lateral decubitus position using the standard positioners. The lower extremity was then prepped and draped in a standard fashion. A preprocedural timeout was then performed once again confirming the patient's correct identity, correct procedure, correct side, and correct site. In addition, allergy status and required equipment were reviewed. Three independent members of the operating room team agreed on the above-mentioned parameters. The ASIS was identified and approximately 2-3 cm posterior to it on the illiac crest a percutaneous stab incision was made and a rikki pin was placed. A second incision was also made and a second rikki pin was placed. The hip navigation [employer] platform was attached to the two pins. Next, A standard posterior approach to the operative hip was then performed in the usual fashion. Once the gluteus essie fascia was identified, it was split in line with its fibers under careful hemostasis using a Bovie. The tip of the trochanter was then identified and a standard arthrotomy was performed, traveling from quadratus femoris in line with the gluteus medius fibers proximally.  The posterior hip capsule was tacked using #1 Ethibond suture. Prior to hip dislocation, the intellijoint button was secured to the greater trochanter with 1 screw and the center of hip rotation was determined from the intellijoint system. Next,  the hip was dislocated without complications. The saddle point of the femur and the lesser trochanter were directly palpated and visualized and freed of any interposed tissues. A standard neck cut was then performed according to preoperative templating using an oscillating saw and an osteotome. The femur was then placed anteriorly to the acetabulum and retracted using an anterior retractor. A second retractor was placed on the posterior inferior aspect of the acetabulum. Next, any remaining labral remnants and scar tissue were carefully removed circumferentially from the acetabulum. The cotyloid fossa was cleared out of any soft tissues under careful hemostasis using a Bovie. Sequential reaming was then performed. Trialing was then performed which was found to fit the patient's anatomy well and abduction, anteversion and offset were all confirmed with the intellijoint system. Accordingly, an appropriately sized Sarah G7 acetabular component was then placed and impacted into place using gentle mallet blows. It was secured in place using two 6.5 mm bone screws. A poly trial was then placed. Attention was then turned to the femur and box osteotome was used to lateralize and a canal finder was used to obtain the adequate trajectory. Sequential broaching was then performed using a Sarah M/L taper broaches. Trialing was then performed and the components were found to achieve excellent stability. Leg length was found to be equal. Accordingly, all trials and the femoral broach were removed. The definitive Sarah G7 liner was placed and impacted using gentle mallet blows.  Once its adequate position was confirmed, attention was turned back to the femur and the above documented Sarah M/L taper was impacted into the proximal femur. Care was taken to avoid any calcar fractures. Trialing was once again commenced and excellent stability was achieved. Accordingly, the head was exchanged for the Biolox ceramic head specifically documented above. The hip was once again reduced and final time taken through range of motion with adequate stability and offset, anteversion and abduction again confirmed using the [employer] navigation system. The two steimann pinns as well as the trochanter button and screw were all removed and accounted for prior to closure. The hip was then irrigated, and the posterior capsule was carefully closed using previously placed tag sutures. It was sutured into the gluteus medius tendon and its insertion at the greater trochanter. A tight capsular repair was achieved. The hip was then once again irrigated using normal saline and closed in a layered fashion. The gluteus essie fascia was closed using #2 quil, and a local anesthetic cocktail was injected into the fascia and subcutaneous tissue circumferentially amrit-incisionally. Subcutaneous closure was achieved using #0 quil, and the skin was closed using staples. Skin was dressed using a Prevena wound vac dressing. The patient was then rolled back into the supine position, transferred onto a regular bed and brought to PACU in stable condition. At the end of the procedure, all sponge, needle, instrument, and scalpel counts were performed and found to be correct. Attending physician Dr. Johnny Mejias was scrubbed and present for all key parts of the procedure. He supervised the entire procedure.        POSTOPERATIVE PLAN  -admit to orthopaedics  -pain control  -DVT ppx: mechanical + lovenox and warfarin  -Weight bearing as tolerated on the left lower extremity, posterior hip precautions  -PT on POD #0  -Medicine co-management        Lina Vega MD  Fellow - Adult Reconstruction  Department of Orthopaedic Surgery  6/14/2022  9:54 AM

## 2022-06-14 NOTE — INTERVAL H&P NOTE
Pre-op Diagnosis: left hip degenerative joint disease    The above referenced H&P was reviewed by Emma Hall MD on 6/14/2022, the patient was examined and no significant changes have occurred in the patient's condition since the H&P was performed. I discussed with the patient and/or legal representative the potential benefits, risks and side effects of this procedure; the likelihood of the patient achieving goals; and potential problems that might occur during recuperation. I discussed reasonable alternatives to the procedure, including risks, benefits and side effects related to the alternatives and risks related to not receiving this procedure. We will proceed with procedure as planned.

## 2022-06-14 NOTE — CM/SW NOTE
CM spoke with RN Lois Slaughter, Pts plan is to return home to [facility]) with [facility] on dc. In the event that pt will need HH, Pts primary Dr. Josephine Lepe [phone] is will to follow pt and arrange [facility]. Plan: Return to [place] with Outpt PT when stable. Presley Toney.  Reyes Irving RN, BSN  Nurse   [phone]

## 2022-06-14 NOTE — ANESTHESIA PROCEDURE NOTES
Airway  Date/Time: 6/14/2022 7:34 AM  Urgency: elective      General Information and Staff    Patient location during procedure: OR  Anesthesiologist: Penni Cheadle, MD  Performed: anesthesiologist     Indications and Patient Condition  Indications for airway management: anesthesia  Sedation level: deep  Preoxygenated: yes  Patient position: sniffing  Mask difficulty assessment: 1 - vent by mask    Final Airway Details  Final airway type: endotracheal airway      Successful airway: ETT  Cuffed: yes   Successful intubation technique: direct laryngoscopy  Facilitating devices/methods: intubating stylet  Endotracheal tube insertion site: oral  Blade: Dedrick  Blade size: #3  ETT size (mm): 7.5    Cormack-Lehane Classification: grade I - full view of glottis  Placement verified by: capnometry   Measured from: lips  ETT to lips (cm): 23  Number of attempts at approach: 1    Additional Comments  Atraumatic x1

## 2022-06-14 NOTE — ANESTHESIA POSTPROCEDURE EVALUATION
Patient: Lynn Aguayo    Procedure Summary     Date: 06/14/22 Room / Location: [facility] MAIN OR 04 / [facility] MAIN OR    Anesthesia Start: 9599 Anesthesia Stop: 1000    Procedure: left total hip arthroplasty (Left Hip) Diagnosis: (left hip degenerative joint disease)    Surgeons: Rakel Puri MD Anesthesiologist: Fan Nur MD    Anesthesia Type: general ASA Status: 3          Anesthesia Type: general    Vitals Value Taken Time   /81 06/14/22 1000   Temp 97.8 06/14/22 1000   Pulse 64 06/14/22 1000   Resp 14 06/14/22 1000   SpO2 94 06/14/22 1000       [facility] AN Post Evaluation:   Patient Evaluated in PACU  Patient Participation: complete - patient participated  Level of Consciousness: awake and alert  Pain Management: adequate  Airway Patency:patent  Yes    Cardiovascular Status: acceptable  Respiratory Status: acceptable and nasal cannula  Postoperative Hydration acceptable      Birney MD Mark  6/14/2022 10:00 AM

## 2022-06-15 VITALS
WEIGHT: 223 LBS | RESPIRATION RATE: 16 BRPM | DIASTOLIC BLOOD PRESSURE: 67 MMHG | BODY MASS INDEX: 33.03 KG/M2 | OXYGEN SATURATION: 96 % | HEART RATE: 57 BPM | SYSTOLIC BLOOD PRESSURE: 151 MMHG | TEMPERATURE: 98 F | HEIGHT: 69 IN

## 2022-06-15 LAB
ANION GAP SERPL CALC-SCNC: 5 MMOL/L (ref 0–18)
BUN BLD-MCNC: 15 MG/DL (ref 7–18)
BUN/CREAT SERPL: 18.1 (ref 10–20)
CALCIUM BLD-MCNC: 8.1 MG/DL (ref 8.5–10.1)
CHLORIDE SERPL-SCNC: 104 MMOL/L (ref 98–112)
CO2 SERPL-SCNC: 28 MMOL/L (ref 21–32)
CREAT BLD-MCNC: 0.83 MG/DL
GLUCOSE BLD-MCNC: 133 MG/DL (ref 70–99)
HCT VFR BLD AUTO: 33 %
HGB BLD-MCNC: 11.3 G/DL
INR BLD: 1.06 (ref 0.8–1.2)
OSMOLALITY SERPL CALC.SUM OF ELEC: 287 MOSM/KG (ref 275–295)
POTASSIUM SERPL-SCNC: 3.6 MMOL/L (ref 3.5–5.1)
PROTHROMBIN TIME: 13.9 SECONDS (ref 11.6–14.8)
SODIUM SERPL-SCNC: 137 MMOL/L (ref 136–145)

## 2022-06-15 PROCEDURE — 85014 HEMATOCRIT: CPT | Performed by: ORTHOPAEDIC SURGERY

## 2022-06-15 PROCEDURE — 97530 THERAPEUTIC ACTIVITIES: CPT

## 2022-06-15 PROCEDURE — 85610 PROTHROMBIN TIME: CPT | Performed by: ORTHOPAEDIC SURGERY

## 2022-06-15 PROCEDURE — 85018 HEMOGLOBIN: CPT | Performed by: ORTHOPAEDIC SURGERY

## 2022-06-15 PROCEDURE — 97116 GAIT TRAINING THERAPY: CPT

## 2022-06-15 PROCEDURE — 97165 OT EVAL LOW COMPLEX 30 MIN: CPT

## 2022-06-15 PROCEDURE — 97535 SELF CARE MNGMENT TRAINING: CPT

## 2022-06-15 PROCEDURE — 80048 BASIC METABOLIC PNL TOTAL CA: CPT | Performed by: ORTHOPAEDIC SURGERY

## 2022-06-15 RX ORDER — HYDROCODONE BITARTRATE AND ACETAMINOPHEN 5; 325 MG/1; MG/1
1 TABLET ORAL EVERY 6 HOURS PRN
Status: DISCONTINUED | OUTPATIENT
Start: 2022-06-15 | End: 2022-06-15

## 2022-06-15 RX ORDER — ACETAMINOPHEN 500 MG
500 TABLET ORAL EVERY 6 HOURS PRN
Status: DISCONTINUED | OUTPATIENT
Start: 2022-06-15 | End: 2022-06-15

## 2022-06-15 RX ORDER — ENOXAPARIN SODIUM 150 MG/ML
150 INJECTION SUBCUTANEOUS DAILY
Qty: 1 ML | Refills: 0 | Status: SHIPPED | OUTPATIENT
Start: 2022-06-15

## 2022-06-15 RX ORDER — HYDROCODONE BITARTRATE AND ACETAMINOPHEN 5; 325 MG/1; MG/1
1 TABLET ORAL EVERY 6 HOURS PRN
Qty: 30 TABLET | Refills: 0 | Status: SHIPPED | OUTPATIENT
Start: 2022-06-15

## 2022-06-15 RX ORDER — TRAMADOL HYDROCHLORIDE 50 MG/1
50 TABLET ORAL EVERY 6 HOURS PRN
Status: DISCONTINUED | OUTPATIENT
Start: 2022-06-15 | End: 2022-06-15

## 2022-06-15 NOTE — PROGRESS NOTES
Face to Face Encounter    I (or a non-physician practitioner working in collaboration with me) had a face to face encounter with this patient during which a medical condition was addressed which is the primary reason for home health care on : 6/15/2022    The encounter was in whole, or in part, for the following medical conditions which is the primary reason for home care. Joint replacement surgery    Based on my findings, the following services are medically necessary home health services (Check all that apply): Nursing, because vital signs monitoring, PT/ INR monitoring and assessment for signs and symptoms of bleeding (if pt on coumadin), wound/ incision assessment, pain assessment and instruction related to pain management, and Physical Therapy, because evaluation of environment for safety (pt is at fall risk), gait training and instruction in the use of assistive devices, instruction and monitoring of therapeutic exercise. The following clinical findings and patient's condition support that this patient is homebound, because narcotic usage every 4-6 hours, inability to ambulate greater than 150 feet, inability to drive a vehicle, fatigue due to anemia, increased risk for infection and increased risk for bleeding. Certification for [address]  Based on the above findings, I certify that this patient is confined to the home (meets homebound criteria listed above) and needs intermittent skilled nursing care, physical therapy and /or speech therapy or continues to need occupational therapy. The patient is under my care, and I have initiated the establishment of the plan of care. This patient will be followed by a physician who will periodically review the plan of care.      Bette Whittington  Nurse Practitioner for Dr. Darren Santizo: [phone]  F: [phone]

## 2022-06-15 NOTE — CM/SW NOTE
06/15/22 1100   Discharge disposition   Expected discharge disposition Home-Health   Post Acute Care Provider Other  (Pts PMD in [facility]) to arrange )   Discharge transportation Private car     Pt discussed during nursing rounds. Pt is stable for dc today and pts daughter is requesting HH on dc. Alvarado Anne NP to send [place] orders to PTs PMD in [facility]). PMD to arranged HH. MD dc order entered. Plan: Home today w/ family and begin [place] upon return to [facility]). / to remain available for support and/or discharge planning. [facility].  Danie Arthur RN, BSN  Nurse   [phone]

## (undated) DEVICE — PILLOW FX 56X38X15CM MED HIP

## (undated) DEVICE — Device: Brand: STABLECUT®

## (undated) DEVICE — SOLUTION  .9 3000ML

## (undated) DEVICE — SHEET,DRAPE,70X100,STERILE: Brand: MEDLINE

## (undated) DEVICE — TOWEL SURG OR 17X30IN BLUE

## (undated) DEVICE — GAUZE SPONGES,12 PLY: Brand: CURITY

## (undated) DEVICE — 3M™ IOBAN™ 2 ANTIMICROBIAL INCISE DRAPE 6650EZ: Brand: IOBAN™ 2

## (undated) DEVICE — CAUTERY TIP BLADE EXTENSION

## (undated) DEVICE — APPLICATOR CHLORAPREP 26ML

## (undated) DEVICE — DEV STRATAFIX MNCRL + SUTR 2-0

## (undated) DEVICE — SOLUTION  .9 1000ML BTL

## (undated) DEVICE — GAMMEX® PI HYBRID SIZE 8.5, STERILE POWDER-FREE SURGICAL GLOVE, POLYISOPRENE AND NEOPRENE BLEND: Brand: GAMMEX

## (undated) DEVICE — PREVENA PEEL & PLACE SYSTEM KIT- 13 CM: Brand: PREVENA™ PEEL & PLACE™

## (undated) DEVICE — PROXIMATE SKIN STAPLERS (35 WIDE) CONTAINS 35 STAINLESS STEEL STAPLES (FIXED HEAD): Brand: PROXIMATE

## (undated) DEVICE — SUT DEV STRATA 1 CTX SXPP1A400

## (undated) DEVICE — DRAPE SRG 70X60IN SPLT U IMPRV

## (undated) DEVICE — DRAPE SHEET LARGE 76X55

## (undated) DEVICE — 3M™ TEGADERM™ TRANSPARENT FILM DRESSING, 1626W, 4 IN X 4-3/4 IN (10 CM X 12 CM), 50 EACH/CARTON, 4 CARTON/CASE: Brand: 3M™ TEGADERM™

## (undated) DEVICE — BIOLOX® DELTA, FEMORAL HEAD, L, CERAMIC, Ø 40/+3.5, TAPER 12/14
Type: IMPLANTABLE DEVICE | Site: HIP
Brand: BIOLOX® DELTA

## (undated) DEVICE — INTELLIJOINT SUPPLIES

## (undated) DEVICE — PEN: MARKING STD PT 100/CS: Brand: MEDICAL ACTION INDUSTRIES

## (undated) DEVICE — SUT VICRYL 2-0 FS-1 J443H

## (undated) DEVICE — BANDAGE COMP PREMPRO 5YDX4IN

## (undated) DEVICE — SUT DEV STRATA 3 PS SXPP1B107

## (undated) DEVICE — SPINOCAN® 18 GA. X 3-1/2 IN. (90 MM) SPINAL NEEDLE: Brand: SPINOCAN®

## (undated) DEVICE — 60 ML SYRINGE LUER-LOCK TIP: Brand: MONOJECT

## (undated) DEVICE — TOTAL HIP: Brand: MEDLINE INDUSTRIES, INC.

## (undated) DEVICE — SUT VICRYL 1CT-1  J341H

## (undated) DEVICE — SYRINGE 35ML LL TIP

## (undated) DEVICE — HOOD, PEEL-AWAY: Brand: FLYTE

## (undated) DEVICE — HOOD: Brand: FLYTE

## (undated) DEVICE — 1010 S-DRAPE TOWEL DRAPE 10/BX: Brand: STERI-DRAPE™